# Patient Record
Sex: MALE | Race: WHITE | NOT HISPANIC OR LATINO | Employment: FULL TIME | URBAN - NONMETROPOLITAN AREA
[De-identification: names, ages, dates, MRNs, and addresses within clinical notes are randomized per-mention and may not be internally consistent; named-entity substitution may affect disease eponyms.]

---

## 2017-12-14 ENCOUNTER — OFFICE VISIT (OUTPATIENT)
Dept: OTOLARYNGOLOGY | Facility: CLINIC | Age: 47
End: 2017-12-14

## 2017-12-14 VITALS
WEIGHT: 212 LBS | DIASTOLIC BLOOD PRESSURE: 82 MMHG | HEART RATE: 68 BPM | HEIGHT: 73 IN | TEMPERATURE: 97.7 F | SYSTOLIC BLOOD PRESSURE: 122 MMHG | BODY MASS INDEX: 28.1 KG/M2

## 2017-12-14 DIAGNOSIS — K21.9 LARYNGOPHARYNGEAL REFLUX (LPR): Primary | ICD-10-CM

## 2017-12-14 DIAGNOSIS — J31.0 OTHER CHRONIC RHINITIS: ICD-10-CM

## 2017-12-14 DIAGNOSIS — J37.0 CHRONIC LARYNGITIS: ICD-10-CM

## 2017-12-14 PROCEDURE — 99204 OFFICE O/P NEW MOD 45 MIN: CPT | Performed by: PHYSICIAN ASSISTANT

## 2017-12-14 PROCEDURE — 31575 DIAGNOSTIC LARYNGOSCOPY: CPT | Performed by: PHYSICIAN ASSISTANT

## 2017-12-14 RX ORDER — ASPIRIN 81 MG/1
81 TABLET ORAL DAILY
COMMUNITY

## 2017-12-14 RX ORDER — FLUTICASONE PROPIONATE 50 MCG
2 SPRAY, SUSPENSION (ML) NASAL DAILY
Qty: 16 G | Refills: 11 | Status: SHIPPED | OUTPATIENT
Start: 2017-12-14

## 2017-12-14 RX ORDER — FOLIC ACID 1 MG/1
1 TABLET ORAL DAILY
COMMUNITY

## 2017-12-14 RX ORDER — OMEPRAZOLE 40 MG/1
40 CAPSULE, DELAYED RELEASE ORAL DAILY
Qty: 30 CAPSULE | Refills: 3 | Status: SHIPPED | OUTPATIENT
Start: 2017-12-14 | End: 2018-01-13

## 2017-12-14 NOTE — PROGRESS NOTES
YOB: 1970  Location: [facility]  Location Address: [address] [place] [address], [facility], [address] [place] [address]  Location Phone: [phone]    Chief Complaint   Patient presents with   • Hoarse       History of Present Illness  Rafael Uribe is a 47 y.o. male.  Rafael Uribe is here for evaluation of ENT complaints. The patient has had problems with voice change  The symptoms are not localized to a particular location. The patient has had mild to moderate symptoms. The symptoms have been present for the last several years The symptoms are aggravated by  excessive voice use. The symptoms are improved by rest.       Past Medical History:   Diagnosis Date   • Dysphonia    • Hyperlipidemia    • Neuroma    • Neuroma of amputation stump        Past Surgical History:   Procedure Laterality Date   • KNEE SURGERY           Current Outpatient Prescriptions:   •  aspirin 81 MG EC tablet, Take 81 mg by mouth Daily., Disp: , Rfl:   •  Atorvastatin Calcium (LIPITOR PO), Take  by mouth., Disp: , Rfl:   •  folic acid (FOLVITE) 1 MG tablet, Take 1 mg by mouth Daily., Disp: , Rfl:   •  fluticasone (FLONASE) 50 MCG/ACT nasal spray, 2 sprays into each nostril Daily., Disp: 16 g, Rfl: 11  •  omeprazole (priLOSEC) 40 MG capsule, Take 1 capsule by mouth Daily for 30 days. Take 40 mg by mouth 30 minute-1 hour before the last meal before going to bed, Disp: 30 capsule, Rfl: 3    Review of patient's allergies indicates no known allergies.    Family History   Problem Relation Age of Onset   • No Known Problems Mother    • No Known Problems Father        Social History     Social History   • Marital status:      Spouse name: N/A   • Number of children: N/A   • Years of education: N/A     Occupational History   • Not on file.     Social History Main Topics   • Smoking status: Never Smoker   • Smokeless tobacco: Never Used   • Alcohol use No   • Drug use: Defer   • Sexual activity: Not on file     Other Topics Concern   • Not  on file     Social History Narrative       Review of Systems   Constitutional: Negative for activity change, appetite change, chills, diaphoresis, fatigue, fever and unexpected weight change.   HENT: Positive for congestion, postnasal drip and voice change. Negative for ear discharge, ear pain, facial swelling, hearing loss, mouth sores, nosebleeds, rhinorrhea, sinus pressure, sneezing, sore throat, tinnitus and trouble swallowing.    Eyes: Negative for pain, discharge, redness, itching and visual disturbance.   Respiratory: Negative for apnea, cough, choking, chest tightness, shortness of breath, wheezing and stridor.    Gastrointestinal: Negative for nausea and vomiting.   Endocrine: Negative for cold intolerance and heat intolerance.   Musculoskeletal: Negative for arthralgias, back pain, gait problem, neck pain and neck stiffness.   Skin: Negative for rash.   Allergic/Immunologic: Negative for environmental allergies and food allergies.   Neurological: Negative for dizziness, tremors, seizures, syncope, facial asymmetry, speech difficulty, weakness, light-headedness, numbness and headaches.   Hematological: Negative for adenopathy. Does not bruise/bleed easily.   Psychiatric/Behavioral: Negative for behavioral problems, sleep disturbance and suicidal ideas. The patient is not nervous/anxious and is not hyperactive.        Vitals:    12/14/17 1105   BP: 122/82   Pulse: 68   Temp: 97.7 °F (36.5 °C)       Objective     Physical Exam  CONSTITUTIONAL: well nourished, alert, oriented, in no acute distress     COMMUNICATION AND VOICE: able to communicate normally, normal voice quality    HEAD: normocephalic, no lesions, atraumatic, no tenderness, no masses     FACE: appearance normal, no lesions, no tenderness, no deformities, facial motion symmetric    SALIVARY GLANDS: parotid glands with no tenderness, no swelling, no masses, submandibular glands with normal size, nontender    EYES: ocular motility normal, eyelids  normal, orbits normal, no proptosis, conjunctiva normal , pupils equal, round     EARS:  Hearing: response to conversational voice normal bilaterally   External Ears: auricles without lesions  Otoscopic: tympanic membrane appearance normal, no lesions, no perforation, normal mobility, no fluid    NOSE:  External Nose: structure normal, no tenderness on palpation, no nasal discharge, no lesions, no evidence of trauma, nostrils patent   Intranasal Exam: nasal mucosa edema and inflammation with drainage, vestibule within normal limits, inferior turbinate normal, nasal septum midline   Nasopharynx:     ORAL:  Lips: upper and lower lips without lesion   Teeth: dentition within normal limits for age   Gums: gingivae healthy   Oral Mucosa: oral mucosa normal, no mucosal lesions   Floor of Mouth: Warthin’s duct patent, mucosa normal  Tongue: lingual mucosa normal without lesions, normal tongue mobility   Palate: soft and hard palates with normal mucosa and structure  Oropharynx: oropharyngeal mucosa normal    NECK: neck appearance normal, no mass,  noted without erythema or tenderness    THYROID: no overt thyromegaly, no tenderness, nodules or mass present on palpation, position midline     LYMPH NODES: no lymphadenopathy    CHEST/RESPIRATORY: respiratory effort normal, normal breath sounds     CARDIOVASCULAR: rate and rhythm normal, extremities without cyanosis or edema      NEUROLOGIC/PSYCHIATRIC: oriented to time, place and person, mood normal, affect appropriate, CN II-XII intact grossly    Procedure Note    Anesthesia: none    Endoscopy Type: Flexible Laryngoscopy    Indications for Procedure: Hoarseness, dysphagia or aspiration - not able to be clearly evaluated by indirect laryngoscopy    Procedure Details:    The patient was placed in the sitting position.  After topical anesthesia and decongestion, the 4 mm laryngoscope was passed.  The nasal cavities, nasopharynx, oropharynx, hypopharynx, and larynx were all  examined.  Vocal cords were examined during respiration and phonation.  The following findings were noted:    Findings:   Mucosal Surfaces:  The mucosal surfaces demonstrated inflammation, thickened mucus, and edema.   Nasopharynx:  The nasopharynx was found to have no lesion or mass.   Base of Tongue:  The base of tongue was found to have no mass or lesion.   Epiglottis:  The epiglottis was found to have no mass or lesion.   Aryepiglottic Folds:  The AE folds were found to have no mass or lesion.   False Vocal Cords:  The false vocal cords were found to have no mass or lesion.   True Vocal Cords:  The true vocal cords were found to have small posterior nodules  Arytenoids:  The arytenoids were found to have findings of intra- arytenoid pachydermic change and erythema.   Hypopharynx:  The hypopharynx was found to have no mass or lesion.   Condition:  Stable.  Patient tolerated procedure well.    Complications:  None      Assessment/Plan   Problems Addressed this Visit        Respiratory    Laryngopharyngeal reflux (LPR) - Primary    Relevant Medications    omeprazole (priLOSEC) 40 MG capsule    Chronic laryngitis    Relevant Medications    omeprazole (priLOSEC) 40 MG capsule    Chronic rhinitis    Relevant Medications    fluticasone (FLONASE) 50 MCG/ACT nasal spray        * Surgery not found *  No orders of the defined types were placed in this encounter.    Return in about 3 months (around 3/14/2018) for Recheck voice.       Patient Instructions   ALL ABOUT HEARTBURN AND THE LIFESTYLE CHANGES THAT HELP    Lifestyle Changes Can Help Relieve The Burning Pain In Your Tummy    What is Heartburn?  Heartburn occurs when the lining of the esophagus (the tube that connects the mouth to the stomach) is exposed to and irritated by stomach acid.  Heartburn often feels like a burning pain in the middle of your chest that moves up your throat.  You may also have the sensation that food has come back into your mouth, leaving a  sour or bitter taste.  Almost everyone has heartburn once in a while.  But if you have heartburn 2 or more times per week, it can be a sign of a more serious problem call gastroesophogeal reflux disease, or GERD.    What causes Heartburn?  Heartburn usually occurs when the valve at the jany of the stomach (the lower esophageal sphincter or LES) doesn’t close the way it should.  If the LES is weak or opens at the wrong time, stomach acid can reflux (flow back) into the esophagus and cause heartburn.  Factors that can affect the LES include:    • Eating or drinking certain foods and beverages such as chocolate, peppermint, fried or fatty foods, coffee, or drinks that contain alcohol  • Having a hiatal hernia - a common physical condition where part of the stomach protrudes up through the diaphragm  • Lying down  • Smoking cigarettes  • Taking certain medications (be sure to tell your doctor about all medications or supplements you take)    What foods can make heartburn worse?  All foods cause the stomach to produce acid, although foods can affect people in different ways.  Following is a list of foods and beverages that may aggravate your symptoms.  You may want to eat them less often.    • Fried or fatty foods  • Heavy seasoning and spicy foods  • Coffee  • Onions  • Orange juice, grapefruit juice, and tomato juice  • Alcoholic beverages  • Chocolate  • Peppermint and spearmint    What else can I do to help control my heartburn?  Try these lifestyle changes:  • Stop Smoking  • Lose weight - if you’re overweight  • Exercise to help control your weight (talk to your doctor first before starting any exercise program).  • Eat small, well-balanced meals  • Reduce abdominal pressure-don’t wear tight belts or tight clothing  • Avoid eating within 2 hours before bedtime  • Elevate your bed so the head is 6 to 8 inches higher than the foot.  This can help reduce acid reflux at night  • Let your doctor know about all the drugs  and supplements you are taking.  Some of them may contribute to your heartburn.    What if these things don’t work?  Talk to your doctor, who can discuss many treatments with you.  Although there are several possible causes of heartburn associated with GERD, they all involve stomach acids.  So no matter what the cause may be, the medicines to reduce stomach acid can prevent heartburn.    Hoarseness and Disorders of the Larynx     The larynx or “voice box” is an intricate part of our bodies that often gets taken for granted.  It is a complex combination of muscles, nerves, and tendons, housed in a “box” of cartilage that sits right at the separation of our esophagus (“swallowing pipe”) and trachea (“windpipe”).  It has two very important functions: protection and vocalization.   The first function, and probably the most important, is to protect the trachea from swallowed food, and inspired dusts and fumes.  When we swallow, the larynx has a series of valves that close off the trachea, and allow the food to slide into the esophagus.  If we breathe in dusts or fumes, the sensitive lining of the larynx gets irritated and triggers a cough reflex so that the foreign particles can be expelled.  It is because of this function of the larynx that vocal cord problems can be associated with swallowing problems and chronic coughing.   The second function of the larynx is to provide a vibration or sound that the upper airway (tongue, mouth lips and sinuses) can shape into the words and sounds that we recognize as an individual voice.  The way we make sounds and words is very similar to the way sound is made on a clarinet.  Initially, sound is made on a clarinet by blowing air across a pat and setting up a vibration similar to the way a breath is passed over our vocal cords to make a sound.   These sounds, however, are very harsh and sound like the buzzing of an insect.  In a clarinet, this sound is then shaped into pleasant  notes by the keys and shaft of the instrument.  In human voices, the vocal cord vibration is formed into pleasant sounds by the tongue, mouth, nose and sinuses so that we can recognize them as an individual voice.   The vocal cords are composed of paired tendons that run from the front of the voice box to the back.  On the surface of this tendon is a very delicate lining called the mucosa.  These two areas are  by a gelatinous connective tissue that allows the mucosa to glide freely over the tendon. When air from the lungs passes over the vocal cord, the mucosa glides back and forth over the tendon, like waves on the ocean.  This shapes the air passing through the separation of the two vocal cords and causes a vibration to be made.   The vocal cords are controlled by very specific muscles that allow the cord to move back and forth, tighten or loosen, and also lengthen or shorten the cord.  When we want to generate a high note, the muscles elongate the vocal cord and make it very stiff, like the high strings of a guitar.  This creates a very fast vibration that we hear as a high pitch.  Conversely, when we need a low-pitched sound, the vocal cords loosen and shorten, like the low strings on a guitar.  The vibration is then slower and makes a low pitch.   Hoarseness is generic term that describes a symptom of disturbed vocal cord function.  When someone is talking with vocal cords that are not vibrating correctly, it is similar to a musician trying to play the clarinet with a broken pat.  When hoarse, the voice may sound breathy, raspy, strained, or there may be changes in volume (loudness) or pitch (how high or low the voice is).  Anything that disturbs the movement of the vocal cords or the “wave” of the lining of the mucosa can cause hoarseness.  The causes can range from a generalized swelling of the vocal cords (as in an acute laryngitis) to a mass on the surface (as in cancer of the vocal cords).  Here  are some common causes:    Acute Laryngitis  The most common cause of hoarseness is acute laryngitis.  Any upper respiratory infection (colds, the flu, a sinus infection, etc) or allergy can cause swelling to occur in the lining of the vocal cord and impair its vibration.  These processes are also occasionally associated with vigorous coughing and throat clearing that put additional strain on the vocal cords.  Vocal Abuse   Any condition that requires loud vocal use, such as yelling at a sporting event or singing at the extremes of our vocal range can cause unnatural strain on the vocal cords.  Often this leads to a short period of swelling and inflammation that disturbs the vibration of the vocal cords.  Over time however, prolonged vocal abuse can lead to bleeding under the lining of the cord or the development of vocal nodules and polyps.  Vocal Nodules and Polyps  More prolonged hoarseness is usually due to using your voice either too much, too loudly, or improperly over extended periods of time.  These habits can lead to vocal nodules (singers nodes), or may lead to polyps of the vocal cords.  Nodules are caused by excessive or abusive vibrations at the midpoint of the vocal cords.  The excessive vibrations can cause an irritation and a thickening, similar to the formation of calluses on the palms of the hands.  Vocal nodules are common in children and adults who raise their voice in work or play.  Polyps are discrete areas of swelling that occur on a single vocal cord as opposed to nodules that occur on both cords.  These can develop spontaneously, after bleeding under the vocal lining, or after the formation of a cyst in the vocal cord.  Gastroesophageal Reflux  A common cause of hoarseness in older adults is gastroesophageal reflux, when stomach acid comes up the swallowing tube (esophagus) and irritates the vocal cords.   Because this can happen at night when asleep, many patients with reflux related  changes of voice do not have symptoms of heartburn.  Usually, the voice is worse in the morning and improves during the day.  These people may have a sensation of a lump in their throat, mucous sticking in their throat or an excessive desire to clear their throat.  Vocal Cord Paralysis  The majority of the motions of the vocal cords are controlled by the recurrent laryngeal nerve. It is called “recurrent” because during its course from the brain to the vocal cords, it actually passes by the voice box and into the chest before taking a turn and rising back into the neck.  Any process that pushes on or invades the nerve along its long course can cause a vocal cord paralysis.  Often times there is no explanation for the impaired motion, but sometimes skull base tumors, thyroid disorders or even growths in the lung or chest are found as a cause of paralysis to the recurrent laryngeal nerve.  Rarely, surgery in the neck, thyroid or chest can injure this nerve and cause the paralysis.  Smoking  Smoking is another cause of hoarseness.  Smoking can cause irritation and swelling of the vocal cords that can also lead to chronic changes in the vocal cord itself.  Since smoking is the major cause of throat cancer, if smokers are hoarse, they should see an otolaryngologist.  Vocal Cord Cancer  Prolonged hoarseness, especially in individuals who smoke, is a warning sign of potential carcinoma of the vocal  cords.  Often the symptoms have been ignored or downplayed with the incorrect assumption that the hoarseness is related to an infection that will not go away.  Early diagnosis of this type of cancer can be lifesaving and often can save the patient from needing to have the voice box surgically removed.  Other Causes  Many unusual causes for hoarseness include allergies, thyroid problems, neurological disorders, trauma to the voice box, and occasionally, the normal menstrual cycle.  Many people experience some hoarseness with  advanced age due to the loss of the bulk of the vocal muscle.  Though hoarseness is the most frequent symptom of vocal cord disorder, sometimes it can be very subtle and other symptoms may be more of a concern.  A chronic dry cough is a very common symptom due to very sensitive nerves that react to irritation by initiating a cough reflex.  Often times the irritation is sensed as a feeling that something is caught in the throat.  Swallowing problems can also occur with vocal cord disorders due to a disturbed protective mechanism during the swallow reflex.    One should seek an evaluation by a voice specialist (an Otolaryngologist, i.e. Ears, Nose and Throat Specialist) if one experiences:  • hoarseness lasts longer than 2-3 weeks  • hoarseness that is associated with any of the following symptoms: pain not from a cold or flu, coughing up blood, excessive weight loss, difficulty swallowing, or a lump in the neck;  • loss or severe change in the voice lasting longer than a few days    An otolaryngologist is able to directly visualize the vocal cords to diagnose the cause of the hoarseness.  This can be done most of the time with a headlight and a special mirror that view the voice box from the back of the throat.  Sometimes, a flexible scope is used if the examination is too difficult to perform with a mirror.  Occasionally, especially if there is a lesion seen in the clinic examination, it is necessary to perform a more intensive examination with the patient asleep in the operating room with a “direct laryngoscopy”.     Treatment of hoarseness depends on the etiology.  Hoarseness caused by allergies or upper respiratory infections can be treated with a combination of antihistamines, mucous thinners, decongestants and antibiotics.  Gastroesophageal reflux mediated hoarseness is best treated with dietary modification and well as antacids.  Often times, voice rest with limited talking is required to improve nodules and  vocal strain.  Sometimes working with a speech therapist is important to identify harmful vocal habits and to develop better vocal techniques, especially in singers and professional voice users such as preachers and teachers.          Here are some general guidelines to help reduce the chance of hoarseness:  • If you smoke, quit.  • Avoid agents that dehydrate the body, such as alcohol and caffeine.  Avoid secondhand smoke, pollens and dusts that might irritate the voice.  • Drink plenty of water (6-8 glasses a day).  Staying well hydrated keeps the mucous produced on the vocal cords very thin and helps to lubricate the vocal cords.  • Humidify your home.  • Watch your diet - avoid spicy and fatty foods.  • Avoid eating a big meal right before lying down to sleep.  This helps to reduce the chance of gastroesophageal reflux.  • Try not to use your voice too long or too loudly.  • Try to avoid vigorous throat clearing and coughing.  • Seek professional voice training.  • If you have to repeatedly use your voice in noisy areas, consider using an amplifier to avoid vocal strain.  • Sing within your range.  • Avoid speaking or sinking when your voice is injured or hoarse.    Recommendation was made to consider a Paleo Diet.      It Starts with Food - Kevin and Kori Mcfarlane was recommended.  The basics of a Paleo diet was covered including a diet composed of meat, fruits and non-leguminous vegetables.  It is important to avoid all processed foods.  This requires that you not eat ANYTHING with a chemical preservative.    The Paleo lifestyle is about nourishing our bodies with real food that is grown and raised as nature intended, not manufactured in a facility.  It is about unplugging from the modern day electronics from time to time and giving your body a chance to actually rest.    It is important to stick very close to this diet for 6 weeks without significant cheating.  It allows you to experience the benefit of  eating this way, giving your body time to detoxify.    Acceptable foods  Fresh Fish/seafood  Fresh meats-preferably grass-fed and free range  Fresh fruits  Fresh vegetables  Healthy fats-Coconut oil, olive oil, avocados etc.  Nuts/seeds    Foods to avoid  Grains  Legumes  Processed foods  Soy  Refined sugar    Web sites include:  www.PrimalBody-PrimalMind.Ecoviate  www.EverydayPaleZaranga.Ecoviate  www.[employer].Ecoviate  Www.PaleStoreFlix.Ecoviate    Other [place] Resources:  NomNom Paleo  PrimalPaleo  Paleo [place]  Nourished      Other Recommended Books:  The Paleo Solution  Wheat Belly  Grain Brain  Primal Body/Primal Mind

## 2017-12-14 NOTE — PATIENT INSTRUCTIONS
ALL ABOUT HEARTBURN AND THE LIFESTYLE CHANGES THAT HELP    Lifestyle Changes Can Help Relieve The Burning Pain In Your Tummy    What is Heartburn?  Heartburn occurs when the lining of the esophagus (the tube that connects the mouth to the stomach) is exposed to and irritated by stomach acid.  Heartburn often feels like a burning pain in the middle of your chest that moves up your throat.  You may also have the sensation that food has come back into your mouth, leaving a sour or bitter taste.  Almost everyone has heartburn once in a while.  But if you have heartburn 2 or more times per week, it can be a sign of a more serious problem call gastroesophogeal reflux disease, or GERD.    What causes Heartburn?  Heartburn usually occurs when the valve at the jany of the stomach (the lower esophageal sphincter or LES) doesn’t close the way it should.  If the LES is weak or opens at the wrong time, stomach acid can reflux (flow back) into the esophagus and cause heartburn.  Factors that can affect the LES include:    • Eating or drinking certain foods and beverages such as chocolate, peppermint, fried or fatty foods, coffee, or drinks that contain alcohol  • Having a hiatal hernia - a common physical condition where part of the stomach protrudes up through the diaphragm  • Lying down  • Smoking cigarettes  • Taking certain medications (be sure to tell your doctor about all medications or supplements you take)    What foods can make heartburn worse?  All foods cause the stomach to produce acid, although foods can affect people in different ways.  Following is a list of foods and beverages that may aggravate your symptoms.  You may want to eat them less often.    • Fried or fatty foods  • Heavy seasoning and spicy foods  • Coffee  • Onions  • Orange juice, grapefruit juice, and tomato juice  • Alcoholic beverages  • Chocolate  • Peppermint and spearmint    What else can I do to help control my heartburn?  Try these lifestyle  changes:  • Stop Smoking  • Lose weight - if you’re overweight  • Exercise to help control your weight (talk to your doctor first before starting any exercise program).  • Eat small, well-balanced meals  • Reduce abdominal pressure-don’t wear tight belts or tight clothing  • Avoid eating within 2 hours before bedtime  • Elevate your bed so the head is 6 to 8 inches higher than the foot.  This can help reduce acid reflux at night  • Let your doctor know about all the drugs and supplements you are taking.  Some of them may contribute to your heartburn.    What if these things don’t work?  Talk to your doctor, who can discuss many treatments with you.  Although there are several possible causes of heartburn associated with GERD, they all involve stomach acids.  So no matter what the cause may be, the medicines to reduce stomach acid can prevent heartburn.    Hoarseness and Disorders of the Larynx     The larynx or “voice box” is an intricate part of our bodies that often gets taken for granted.  It is a complex combination of muscles, nerves, and tendons, housed in a “box” of cartilage that sits right at the separation of our esophagus (“swallowing pipe”) and trachea (“windpipe”).  It has two very important functions: protection and vocalization.   The first function, and probably the most important, is to protect the trachea from swallowed food, and inspired dusts and fumes.  When we swallow, the larynx has a series of valves that close off the trachea, and allow the food to slide into the esophagus.  If we breathe in dusts or fumes, the sensitive lining of the larynx gets irritated and triggers a cough reflex so that the foreign particles can be expelled.  It is because of this function of the larynx that vocal cord problems can be associated with swallowing problems and chronic coughing.   The second function of the larynx is to provide a vibration or sound that the upper airway (tongue, mouth lips and sinuses) can  shape into the words and sounds that we recognize as an individual voice.  The way we make sounds and words is very similar to the way sound is made on a clarinet.  Initially, sound is made on a clarinet by blowing air across a pat and setting up a vibration similar to the way a breath is passed over our vocal cords to make a sound.   These sounds, however, are very harsh and sound like the buzzing of an insect.  In a clarinet, this sound is then shaped into pleasant notes by the keys and shaft of the instrument.  In human voices, the vocal cord vibration is formed into pleasant sounds by the tongue, mouth, nose and sinuses so that we can recognize them as an individual voice.   The vocal cords are composed of paired tendons that run from the front of the voice box to the back.  On the surface of this tendon is a very delicate lining called the mucosa.  These two areas are  by a gelatinous connective tissue that allows the mucosa to glide freely over the tendon. When air from the lungs passes over the vocal cord, the mucosa glides back and forth over the tendon, like waves on the ocean.  This shapes the air passing through the separation of the two vocal cords and causes a vibration to be made.   The vocal cords are controlled by very specific muscles that allow the cord to move back and forth, tighten or loosen, and also lengthen or shorten the cord.  When we want to generate a high note, the muscles elongate the vocal cord and make it very stiff, like the high strings of a guitar.  This creates a very fast vibration that we hear as a high pitch.  Conversely, when we need a low-pitched sound, the vocal cords loosen and shorten, like the low strings on a guitar.  The vibration is then slower and makes a low pitch.   Hoarseness is generic term that describes a symptom of disturbed vocal cord function.  When someone is talking with vocal cords that are not vibrating correctly, it is similar to a musician  trying to play the clarinet with a broken pat.  When hoarse, the voice may sound breathy, raspy, strained, or there may be changes in volume (loudness) or pitch (how high or low the voice is).  Anything that disturbs the movement of the vocal cords or the “wave” of the lining of the mucosa can cause hoarseness.  The causes can range from a generalized swelling of the vocal cords (as in an acute laryngitis) to a mass on the surface (as in cancer of the vocal cords).  Here are some common causes:    Acute Laryngitis  The most common cause of hoarseness is acute laryngitis.  Any upper respiratory infection (colds, the flu, a sinus infection, etc) or allergy can cause swelling to occur in the lining of the vocal cord and impair its vibration.  These processes are also occasionally associated with vigorous coughing and throat clearing that put additional strain on the vocal cords.  Vocal Abuse   Any condition that requires loud vocal use, such as yelling at a sporting event or singing at the extremes of our vocal range can cause unnatural strain on the vocal cords.  Often this leads to a short period of swelling and inflammation that disturbs the vibration of the vocal cords.  Over time however, prolonged vocal abuse can lead to bleeding under the lining of the cord or the development of vocal nodules and polyps.  Vocal Nodules and Polyps  More prolonged hoarseness is usually due to using your voice either too much, too loudly, or improperly over extended periods of time.  These habits can lead to vocal nodules (singers nodes), or may lead to polyps of the vocal cords.  Nodules are caused by excessive or abusive vibrations at the midpoint of the vocal cords.  The excessive vibrations can cause an irritation and a thickening, similar to the formation of calluses on the palms of the hands.  Vocal nodules are common in children and adults who raise their voice in work or play.  Polyps are discrete areas of swelling that  occur on a single vocal cord as opposed to nodules that occur on both cords.  These can develop spontaneously, after bleeding under the vocal lining, or after the formation of a cyst in the vocal cord.  Gastroesophageal Reflux  A common cause of hoarseness in older adults is gastroesophageal reflux, when stomach acid comes up the swallowing tube (esophagus) and irritates the vocal cords.   Because this can happen at night when asleep, many patients with reflux related changes of voice do not have symptoms of heartburn.  Usually, the voice is worse in the morning and improves during the day.  These people may have a sensation of a lump in their throat, mucous sticking in their throat or an excessive desire to clear their throat.  Vocal Cord Paralysis  The majority of the motions of the vocal cords are controlled by the recurrent laryngeal nerve. It is called “recurrent” because during its course from the brain to the vocal cords, it actually passes by the voice box and into the chest before taking a turn and rising back into the neck.  Any process that pushes on or invades the nerve along its long course can cause a vocal cord paralysis.  Often times there is no explanation for the impaired motion, but sometimes skull base tumors, thyroid disorders or even growths in the lung or chest are found as a cause of paralysis to the recurrent laryngeal nerve.  Rarely, surgery in the neck, thyroid or chest can injure this nerve and cause the paralysis.  Smoking  Smoking is another cause of hoarseness.  Smoking can cause irritation and swelling of the vocal cords that can also lead to chronic changes in the vocal cord itself.  Since smoking is the major cause of throat cancer, if smokers are hoarse, they should see an otolaryngologist.  Vocal Cord Cancer  Prolonged hoarseness, especially in individuals who smoke, is a warning sign of potential carcinoma of the vocal  cords.  Often the symptoms have been ignored or downplayed  with the incorrect assumption that the hoarseness is related to an infection that will not go away.  Early diagnosis of this type of cancer can be lifesaving and often can save the patient from needing to have the voice box surgically removed.  Other Causes  Many unusual causes for hoarseness include allergies, thyroid problems, neurological disorders, trauma to the voice box, and occasionally, the normal menstrual cycle.  Many people experience some hoarseness with advanced age due to the loss of the bulk of the vocal muscle.  Though hoarseness is the most frequent symptom of vocal cord disorder, sometimes it can be very subtle and other symptoms may be more of a concern.  A chronic dry cough is a very common symptom due to very sensitive nerves that react to irritation by initiating a cough reflex.  Often times the irritation is sensed as a feeling that something is caught in the throat.  Swallowing problems can also occur with vocal cord disorders due to a disturbed protective mechanism during the swallow reflex.    One should seek an evaluation by a voice specialist (an Otolaryngologist, i.e. Ears, Nose and Throat Specialist) if one experiences:  • hoarseness lasts longer than 2-3 weeks  • hoarseness that is associated with any of the following symptoms: pain not from a cold or flu, coughing up blood, excessive weight loss, difficulty swallowing, or a lump in the neck;  • loss or severe change in the voice lasting longer than a few days    An otolaryngologist is able to directly visualize the vocal cords to diagnose the cause of the hoarseness.  This can be done most of the time with a headlight and a special mirror that view the voice box from the back of the throat.  Sometimes, a flexible scope is used if the examination is too difficult to perform with a mirror.  Occasionally, especially if there is a lesion seen in the clinic examination, it is necessary to perform a more intensive examination with the  patient asleep in the operating room with a “direct laryngoscopy”.     Treatment of hoarseness depends on the etiology.  Hoarseness caused by allergies or upper respiratory infections can be treated with a combination of antihistamines, mucous thinners, decongestants and antibiotics.  Gastroesophageal reflux mediated hoarseness is best treated with dietary modification and well as antacids.  Often times, voice rest with limited talking is required to improve nodules and vocal strain.  Sometimes working with a speech therapist is important to identify harmful vocal habits and to develop better vocal techniques, especially in singers and professional voice users such as preachers and teachers.          Here are some general guidelines to help reduce the chance of hoarseness:  • If you smoke, quit.  • Avoid agents that dehydrate the body, such as alcohol and caffeine.  Avoid secondhand smoke, pollens and dusts that might irritate the voice.  • Drink plenty of water (6-8 glasses a day).  Staying well hydrated keeps the mucous produced on the vocal cords very thin and helps to lubricate the vocal cords.  • Humidify your home.  • Watch your diet - avoid spicy and fatty foods.  • Avoid eating a big meal right before lying down to sleep.  This helps to reduce the chance of gastroesophageal reflux.  • Try not to use your voice too long or too loudly.  • Try to avoid vigorous throat clearing and coughing.  • Seek professional voice training.  • If you have to repeatedly use your voice in noisy areas, consider using an amplifier to avoid vocal strain.  • Sing within your range.  • Avoid speaking or sinking when your voice is injured or hoarse.    Recommendation was made to consider a Paleo Diet.      It Starts with Food - Kevin and Kori Mcfarlane was recommended.  The basics of a Paleo diet was covered including a diet composed of meat, fruits and non-leguminous vegetables.  It is important to avoid all processed foods.   This requires that you not eat ANYTHING with a chemical preservative.    The Paleo lifestyle is about nourishing our bodies with real food that is grown and raised as nature intended, not manufactured in a facility.  It is about unplugging from the modern day electronics from time to time and giving your body a chance to actually rest.    It is important to stick very close to this diet for 6 weeks without significant cheating.  It allows you to experience the benefit of eating this way, giving your body time to detoxify.    Acceptable foods  Fresh Fish/seafood  Fresh meats-preferably grass-fed and free range  Fresh fruits  Fresh vegetables  Healthy fats-Coconut oil, olive oil, avocados etc.  Nuts/seeds    Foods to avoid  Grains  Legumes  Processed foods  Soy  Refined sugar    Web sites include:  www.PrimalBody-PrimalMind.Dafiti  www.[employer].Dafiti  www.[employer].Dafiti  Www.PaleWeichaishi.com.Dafiti    Other [place] Resources:  Jake Andujar  PrimalPalecatina  Paleo Vancleave  Nourished      Other Recommended Books:  The Paleo Solution  Wheat Belly  Grain Brain  Primal Body/Primal Mind

## 2020-07-30 ENCOUNTER — OUTSIDE FACILITY SERVICE (OUTPATIENT)
Dept: CARDIOLOGY | Facility: CLINIC | Age: 50
End: 2020-07-30

## 2020-07-30 PROCEDURE — 93018 CV STRESS TEST I&R ONLY: CPT | Performed by: INTERNAL MEDICINE

## 2020-07-30 PROCEDURE — 93350 STRESS TTE ONLY: CPT | Performed by: INTERNAL MEDICINE
